# Patient Record
Sex: MALE | Race: WHITE | Employment: PART TIME | URBAN - METROPOLITAN AREA
[De-identification: names, ages, dates, MRNs, and addresses within clinical notes are randomized per-mention and may not be internally consistent; named-entity substitution may affect disease eponyms.]

---

## 2021-05-04 ENCOUNTER — OFFICE VISIT (OUTPATIENT)
Dept: FAMILY MEDICINE | Facility: CLINIC | Age: 26
End: 2021-05-04
Payer: COMMERCIAL

## 2021-05-04 VITALS
WEIGHT: 198.2 LBS | BODY MASS INDEX: 26.84 KG/M2 | TEMPERATURE: 97.5 F | OXYGEN SATURATION: 98 % | HEIGHT: 72 IN | HEART RATE: 94 BPM | SYSTOLIC BLOOD PRESSURE: 104 MMHG | DIASTOLIC BLOOD PRESSURE: 50 MMHG | RESPIRATION RATE: 24 BRPM

## 2021-05-04 DIAGNOSIS — J35.1 HYPERTROPHY OF TONSILS: Primary | ICD-10-CM

## 2021-05-04 PROCEDURE — 99203 OFFICE O/P NEW LOW 30 MIN: CPT | Performed by: FAMILY MEDICINE

## 2021-05-04 RX ORDER — PREDNISONE 20 MG/1
20 TABLET ORAL DAILY
Qty: 5 TABLET | Refills: 0 | Status: SHIPPED | OUTPATIENT
Start: 2021-05-04

## 2021-05-04 RX ORDER — AMOXICILLIN 875 MG
875 TABLET ORAL 2 TIMES DAILY
Qty: 20 TABLET | Refills: 0 | Status: SHIPPED | OUTPATIENT
Start: 2021-05-04 | End: 2021-05-14

## 2021-05-04 ASSESSMENT — MIFFLIN-ST. JEOR: SCORE: 1922.48

## 2021-05-04 ASSESSMENT — PAIN SCALES - GENERAL: PAINLEVEL: NO PAIN (0)

## 2021-05-04 NOTE — PROGRESS NOTES
Assessment & Plan     Hypertrophy of tonsils  Was treated with amoxicillin for tonsillitis, for 10 days.  He reports he did feel better however continued to have swollen, minor pain.  Otherwise, no fever, no chills.  Given a prescription for prednisone for 5 days.  Given a written prescription for amoxicillin.  However, advised patient not to fill amoxicillin at this point.  To see how his symptoms are in the next 5 to 7 days    - predniSONE (DELTASONE) 20 MG tablet; Take 1 tablet (20 mg) by mouth daily  - amoxicillin (AMOXIL) 875 MG tablet; Take 1 tablet (875 mg) by mouth 2 times daily for 10 days    Return in about 4 weeks (around 6/1/2021) for Routine preventive.    Sridhar Espinoza MD  [facility]icsSAR[facility] Bridges is a 26 year old who presents for the following health issues :  Was treated with amoxicillin for tonsillitis, for 10 days.  He reports he did feel better however continued to have swollen, minor pain.  Otherwise no fever, no chills.  HPI     New Patient/Transfer of Care  Medication Followup of Amoxicillin     Taking Medication as prescribed: NO-completed    Side Effects:  None    Medication Helping Symptoms:  yes     New Patient/Transfer of Care    Review of Systems         Objective    There were no vitals taken for this visit.  There is no height or weight on file to calculate BMI.  Physical Exam   GENERAL: healthy, alert and no distress  HENT: normal cephalic/atraumatic, ear canals and TM's normal, oral mucous membranes moist and tonsillar hypertrophy  NECK: no adenopathy, no asymmetry, masses, or scars and thyroid normal to palpation  ABDOMEN: soft, nontender, no hepatosplenomegaly, no masses and bowel sounds normal  PSYCH: mentation appears normal, affect normal/bright      Sridhar Espinoza MD